# Patient Record
Sex: MALE | Race: BLACK OR AFRICAN AMERICAN | Employment: FULL TIME | URBAN - METROPOLITAN AREA
[De-identification: names, ages, dates, MRNs, and addresses within clinical notes are randomized per-mention and may not be internally consistent; named-entity substitution may affect disease eponyms.]

---

## 2021-12-02 ENCOUNTER — HOSPITAL ENCOUNTER (EMERGENCY)
Facility: CLINIC | Age: 51
Discharge: HOME | End: 2021-12-02
Attending: EMERGENCY MEDICINE | Admitting: EMERGENCY MEDICINE
Payer: COMMERCIAL

## 2021-12-02 VITALS
HEART RATE: 99 BPM | DIASTOLIC BLOOD PRESSURE: 97 MMHG | WEIGHT: 164 LBS | TEMPERATURE: 98.3 F | RESPIRATION RATE: 16 BRPM | OXYGEN SATURATION: 98 % | SYSTOLIC BLOOD PRESSURE: 135 MMHG | BODY MASS INDEX: 27.72 KG/M2

## 2021-12-02 DIAGNOSIS — K02.9 DENTAL CARIES: ICD-10-CM

## 2021-12-02 PROCEDURE — 99283 EMERGENCY DEPT VISIT LOW MDM: CPT | Performed by: EMERGENCY MEDICINE

## 2021-12-02 RX ORDER — TRAMADOL HYDROCHLORIDE 50 MG/1
50-100 TABLET ORAL EVERY 6 HOURS PRN
Qty: 15 TABLET | Refills: 0 | Status: SHIPPED | OUTPATIENT
Start: 2021-12-02

## 2021-12-02 ASSESSMENT — ENCOUNTER SYMPTOMS
CONFUSION: 0
ABDOMINAL PAIN: 0
ARTHRALGIAS: 0
NECK STIFFNESS: 0
SHORTNESS OF BREATH: 0
COLOR CHANGE: 0
EYE REDNESS: 0
FEVER: 0
HEADACHES: 1
DIFFICULTY URINATING: 0

## 2021-12-02 NOTE — ED PROVIDER NOTES
ED Provider Note  [facility]      History     Chief Complaint   Patient presents with     Dental Pain     Pt saw a dentist 2 days ago. He said the teeth were fine but he has an infection in his mouth. He was stated on antibiotics. The pt thinks that the mouth is worse      The history is provided by the patient and medical records.     Parish Koenig is a 51 year old male who presents to the ED today with upper left second molar pain.  Patient was seen 2 days ago by his dentist and was given amoxicillin.  He states that he woke up this morning and his pain was worse and decided to come to the ED for evaluation.  He is concerned for a blood infection and is requesting a blood test.  Patient also reports a headache.  He reports taking ibuprofen for pain.    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  traMADol (ULTRAM) 50 MG tablet  IBUPROFEN 600 MG OR TABS      Allergies   Allergen Reactions     No Known Drug Allergies      Family History  Family History   Problem Relation Age of Onset     Family History Negative Mother      Family History Negative Father      Social History   Social History     Tobacco Use     Smoking status: Current Every Day Smoker     Packs/day: 0.50     Years: 14.00     Pack years: 7.00     Types: Cigarettes     Smokeless tobacco: Not on file     Tobacco comment: [place] light   Substance Use Topics     Alcohol use: No     Drug use: No      Past medical history, past surgical history, medications, allergies, family history, and social history were reviewed with the patient. No additional pertinent items.       Review of Systems   Constitutional: Negative for fever.   HENT: Positive for dental problem. Negative for congestion.    Eyes: Negative for redness.    Respiratory: Negative for shortness of breath.    Cardiovascular: Negative for chest pain.   Gastrointestinal: Negative for abdominal pain.   Genitourinary: Negative for difficulty urinating.   Musculoskeletal: Negative for arthralgias and neck stiffness.   Skin: Negative for color change.   Neurological: Positive for headaches.   Psychiatric/Behavioral: Negative for confusion.   All other systems reviewed and are negative.    A complete review of systems was performed with pertinent positives and negatives noted in the HPI, and all other systems negative.    Physical Exam   BP: (!) 153/108  Pulse: 98  Temp: 98.3  F (36.8  C)  Resp: 16  Weight: 74.4 kg (164 lb)  SpO2: 99 %  Physical Exam  Constitutional:       General: He is not in acute distress.     Appearance: He is not diaphoretic.   HENT:      Head: Atraumatic.        Mouth/Throat:      Dentition: Dental tenderness and dental caries present.   Eyes:      General: No scleral icterus.     Pupils: Pupils are equal, round, and reactive to light.   Cardiovascular:      Heart sounds: Normal heart sounds.   Pulmonary:      Effort: No respiratory distress.      Breath sounds: Normal breath sounds.   Abdominal:      General: Bowel sounds are normal.      Palpations: Abdomen is soft.      Tenderness: There is no abdominal tenderness.   Musculoskeletal:         General: No tenderness.   Skin:     General: Skin is warm.      Findings: No rash.         ED Course     11:55 AM  The patient was seen and examined by Zach Norwood MD, in Room ED05.     Procedures       The medical record was reviewed and interpreted.              No results found for any visits on 12/02/21.  Medications - No data to display     Assessments & Plan (with Medical Decision Making)   51-year-old male who presents for evaluation of left facial pain.  Differential includes sinusitis, zoster, dental infection, trigeminal neuralgia, occult injury.  Examination reveals grossly carious tooth #17.  There  does not appear to be facial swelling but tenderness above the tooth.  Patient was encouraged to finish his current supply of antibiotics in addition to using ibuprofen for discomfort.  He will use Ultram for breakthrough pain.    I have reviewed the nursing notes. I have reviewed the findings, diagnosis, plan and need for follow up with the patient.    New Prescriptions    TRAMADOL (ULTRAM) 50 MG TABLET    Take 1-2 tablets ( mg) by mouth every 6 hours as needed for pain       Final diagnoses:   Dental caries     I, Mily Patel, am serving as a trained medical scribe to document services personally performed by Zach Norwood MD based on the provider's statements to me on December 2, 2021.  This document has been checked and approved by the attending provider.    I, Zach Norwood MD, was physically present and have reviewed and verified the accuracy of this note documented by Mily Patel, medical scribe.      Zach Norwood MD        --  Zach Norwood MD  [facility] EMERGENCY DEPARTMENT  12/2/2021     Zach Norwood MD  12/02/21 1231

## 2021-12-02 NOTE — DISCHARGE INSTRUCTIONS
Follow-up with your dentist as soon as possible.  Take all antibiotics (amoxicillin) as prescribed